# Patient Record
Sex: FEMALE | Race: WHITE | NOT HISPANIC OR LATINO | Employment: OTHER | URBAN - NONMETROPOLITAN AREA
[De-identification: names, ages, dates, MRNs, and addresses within clinical notes are randomized per-mention and may not be internally consistent; named-entity substitution may affect disease eponyms.]

---

## 2018-06-12 ENCOUNTER — OFFICE VISIT (OUTPATIENT)
Dept: CARDIOLOGY | Facility: CLINIC | Age: 73
End: 2018-06-12

## 2018-06-12 VITALS
WEIGHT: 165 LBS | BODY MASS INDEX: 27.49 KG/M2 | HEIGHT: 65 IN | HEART RATE: 73 BPM | DIASTOLIC BLOOD PRESSURE: 80 MMHG | SYSTOLIC BLOOD PRESSURE: 143 MMHG

## 2018-06-12 DIAGNOSIS — R07.89 OTHER CHEST PAIN: ICD-10-CM

## 2018-06-12 DIAGNOSIS — I10 ESSENTIAL HYPERTENSION: Primary | ICD-10-CM

## 2018-06-12 DIAGNOSIS — E78.5 HYPERLIPIDEMIA, UNSPECIFIED HYPERLIPIDEMIA TYPE: ICD-10-CM

## 2018-06-12 PROCEDURE — 99204 OFFICE O/P NEW MOD 45 MIN: CPT | Performed by: INTERNAL MEDICINE

## 2018-06-12 PROCEDURE — 93000 ELECTROCARDIOGRAM COMPLETE: CPT | Performed by: INTERNAL MEDICINE

## 2018-06-12 RX ORDER — GABAPENTIN 300 MG/1
300 CAPSULE ORAL NIGHTLY
COMMUNITY

## 2018-06-12 RX ORDER — SIMVASTATIN 40 MG
40 TABLET ORAL DAILY
COMMUNITY

## 2018-06-12 RX ORDER — NITROGLYCERIN 0.4 MG/1
TABLET SUBLINGUAL
Qty: 100 TABLET | Refills: 11 | Status: SHIPPED | OUTPATIENT
Start: 2018-06-12

## 2018-06-12 RX ORDER — LOSARTAN POTASSIUM 50 MG/1
100 TABLET ORAL DAILY
COMMUNITY
End: 2018-07-25 | Stop reason: SDUPTHER

## 2018-06-12 RX ORDER — ATENOLOL 50 MG/1
TABLET ORAL DAILY
COMMUNITY
Start: 2018-04-24 | End: 2018-06-12 | Stop reason: ALTCHOICE

## 2018-06-12 RX ORDER — LEVOTHYROXINE SODIUM 0.1 MG/1
100 TABLET ORAL DAILY
COMMUNITY

## 2018-06-12 RX ORDER — CYCLOBENZAPRINE HCL 10 MG
10 TABLET ORAL 3 TIMES DAILY
COMMUNITY

## 2018-06-12 RX ORDER — CARVEDILOL 12.5 MG/1
12.5 TABLET ORAL 2 TIMES DAILY
Qty: 180 TABLET | Refills: 3 | Status: SHIPPED | OUTPATIENT
Start: 2018-06-12

## 2018-06-12 NOTE — PROGRESS NOTES
Subjective    Jeremiah Dennis is a 72 y.o. female. Referred by pcp for labile htn and cp      History of Present Illness     CHEST PAIN:  Has had for a long time. Non-exertional and sharp left sided pain that radiates to left arm. Has seen Dr Dailey in [facility] and had a stress nuc 5/16 that was low risk for ischemia and the cp did not occur during the test. LVEF was normal too. She has not had any testing since to look for non-cardiac causes of cp. EKG is FAV but ow nml. She has first degree relatives with IHD and she shares similar traits with them which scares her. She has had ER visits for this that have been neg for ACS. She has known GERD but has not tried antacids for this pain.    HTN:  She compulsively checks her BP and uses Clonidine prn. 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 She wants to change BP meds at this time. Labs are ok. She doesn't think that pain or anxiety is playing a role in her BP elevations but is unwilling to stop NSAID's. She is not interested in life-style / dietary changes at this time.    HLD:  Tolerates a potent statin but LDL is 89 at last check - she is considering stopping but has been encouraged not to.        The following portions of the patient's history were reviewed and updated as appropriate: allergies, current medications, past family history, past medical history, past social history, past surgical history and problem list.    Patient Active Problem List   Diagnosis   • Other chest pain   • Hypertension   • Hyperlipidemia       Allergies   Allergen Reactions   • Iodine Anaphylaxis   • Aspirin Other (See Comments)   • Nickel Rash       Family History   Problem Relation Age of Onset   • Stroke Father    • Heart attack Brother        Social History     Social History   • Marital status:      Spouse name: N/A   • Number of children: N/A   • Years of education: N/A     Occupational History   • Not on file.     Social History Main Topics   •  Smoking status: Former Smoker     Quit date: 1980   • Smokeless tobacco: Never Used   • Alcohol use No   • Drug use: No   • Sexual activity: Defer     Other Topics Concern   • Not on file     Social History Narrative   • No narrative on file         Current Outpatient Prescriptions:   •  cyclobenzaprine (FLEXERIL) 10 MG tablet, 10 mg 3 (Three) Times a Day., Disp: , Rfl:   •  diclofenac (VOLTAREN) 50 MG EC tablet, 50 mg 2 (Two) Times a Day., Disp: , Rfl:   •  esomeprazole 80 mg in sodium chloride 0.9 % 100 mL IVPB, Take 40 mg by mouth Daily., Disp: , Rfl:   •  estrogens, conjugated, (PREMARIN) 0.3 MG tablet, 0.3 mg Daily. TAKING EVERY OTHER DAY, Disp: , Rfl:   •  gabapentin (NEURONTIN) 300 MG capsule, 300 mg 3 (Three) Times a Day., Disp: , Rfl:   •  levothyroxine (LEVOXYL) 100 MCG tablet, 100 mcg Daily., Disp: , Rfl:   •  losartan (COZAAR) 50 MG tablet, Take 25 mg by mouth 2 (Two) Times a Day. TAKING TWO 25 MG TABLETS TWICE DAILY, Disp: , Rfl:   •  simvastatin (ZOCOR) 40 MG tablet, 40 mg Daily., Disp: , Rfl:   •  carvedilol (COREG) 12.5 MG tablet, Take 1 tablet by mouth 2 (Two) Times a Day., Disp: 180 tablet, Rfl: 3  •  nitroglycerin (NITROSTAT) 0.4 MG SL tablet, 1 under the tongue as needed for angina, may repeat q5mins for up three doses, Disp: 100 tablet, Rfl: 11    Past Surgical History:   Procedure Laterality Date   • GALLBLADDER SURGERY     • HYSTERECTOMY         Review of Systems   Constitutional: Negative for fatigue, fever and unexpected weight change.   HENT: Negative for congestion.    Eyes: Negative for visual disturbance.   Respiratory: Negative for apnea, shortness of breath and wheezing.    Cardiovascular: Positive for chest pain. Negative for palpitations and leg swelling.   Gastrointestinal: Negative for abdominal pain, blood in stool and vomiting.   Endocrine: Negative for cold intolerance and heat intolerance.   Genitourinary: Negative for difficulty urinating and hematuria.   Musculoskeletal:  "Positive for arthralgias. Negative for myalgias.   Skin: Negative for rash.   Neurological: Negative for syncope.   Hematological: Does not bruise/bleed easily.   Psychiatric/Behavioral: Negative for sleep disturbance.       /80   Pulse 73   Ht 163.8 cm (64.5\")   Wt 74.8 kg (165 lb)   BMI 27.88 kg/m²   Procedures    Objective   Physical Exam   Constitutional: She is oriented to person, place, and time. She appears well-developed and well-nourished. No distress.   HENT:   Head: Normocephalic.   Eyes: Pupils are equal, round, and reactive to light.   Neck: No thyromegaly present.   Cardiovascular: Normal rate, regular rhythm, normal heart sounds and intact distal pulses.  Exam reveals no gallop and no friction rub.    No murmur heard.  Pulmonary/Chest: Effort normal and breath sounds normal. No respiratory distress. She has no wheezes. She has no rales.   Abdominal: Soft. Bowel sounds are normal. She exhibits no distension. There is no tenderness. There is no guarding.   Musculoskeletal: She exhibits no edema.   Neurological: She is alert and oriented to person, place, and time.   Skin: Skin is warm and dry. She is not diaphoretic.   Psychiatric: She has a normal mood and affect.       Assessment/Plan   Jeremiah was seen today for chest pain, hypertension and hyperlipidemia.    Diagnoses and all orders for this visit:    Essential hypertension  Comments:  CHANGE ATENOLOL TO CARVEDILOL, HOME BP DIARY TO NEXT ERIC  Orders:  -     ECG 12 Lead  -     carvedilol (COREG) 12.5 MG tablet; Take 1 tablet by mouth 2 (Two) Times a Day.    Hyperlipidemia, unspecified hyperlipidemia type  Comments:  CPT    Other chest pain  Comments:  ATYPICAL FOR ANGINA - TRY PRN LIQUID ANTACIDS BEFORE TRYING NTG SL FOR RECURRENCE - CALL EMS FOR CP NOT RELIEVED WITH EITHER  Orders:  -     nitroglycerin (NITROSTAT) 0.4 MG SL tablet; 1 under the tongue as needed for angina, may repeat q5mins for up three doses    Other orders  -     SCANNED " EKG                 Return in about 6 weeks (around 7/24/2018).  Orders Placed This Encounter   Procedures   • SCANNED EKG   • ECG 12 Lead     Order Specific Question:   Reason for Exam:     Answer:   CHEST PAIN

## 2018-07-25 ENCOUNTER — OFFICE VISIT (OUTPATIENT)
Dept: CARDIOLOGY | Facility: CLINIC | Age: 73
End: 2018-07-25

## 2018-07-25 VITALS
WEIGHT: 162 LBS | DIASTOLIC BLOOD PRESSURE: 80 MMHG | SYSTOLIC BLOOD PRESSURE: 112 MMHG | BODY MASS INDEX: 28.7 KG/M2 | OXYGEN SATURATION: 98 % | HEART RATE: 81 BPM | HEIGHT: 63 IN

## 2018-07-25 DIAGNOSIS — R07.89 OTHER CHEST PAIN: ICD-10-CM

## 2018-07-25 DIAGNOSIS — E78.5 HYPERLIPIDEMIA, UNSPECIFIED HYPERLIPIDEMIA TYPE: ICD-10-CM

## 2018-07-25 DIAGNOSIS — I10 ESSENTIAL HYPERTENSION: Primary | ICD-10-CM

## 2018-07-25 PROCEDURE — 99213 OFFICE O/P EST LOW 20 MIN: CPT | Performed by: PHYSICIAN ASSISTANT

## 2018-07-25 RX ORDER — LOSARTAN POTASSIUM 50 MG/1
50 TABLET ORAL 2 TIMES DAILY
Qty: 180 TABLET | Refills: 3 | Status: SHIPPED | OUTPATIENT
Start: 2018-07-25

## 2018-07-25 NOTE — PROGRESS NOTES
Subjective:     Encounter Date:07/25/2018      Patient ID: Jeremiah Dennis is a 72 y.o. female w hx of HTN and HLD who [facility]. She notes since changing from Atenolol to Carvedilol her blood pressure is improving. She shows me her home blood pressure log with average SBP 120s-130s. She denies any further chest pain since her last visit. She describes an active lifestyle with good stamina; denying any significant dyspnea on exertion.     Chief Complaint:  Hypertension   This is a chronic problem. The current episode started more than 1 year ago. The problem has been gradually improving since onset. The problem is controlled. Pertinent negatives include no chest pain, malaise/fatigue, orthopnea, palpitations, PND or shortness of breath. There are no associated agents to hypertension. Risk factors for coronary artery disease include post-menopausal state and dyslipidemia. Past treatments include beta blockers and angiotensin blockers. Current antihypertension treatment includes beta blockers and angiotensin blockers. The current treatment provides moderate improvement. Compliance problems include exercise and diet.  There is no history of CAD/MI. There is no history of chronic renal disease.   Chest Pain    This is a recurrent problem. The current episode started more than 1 month ago. The onset quality is sudden. The problem occurs rarely. The problem has been resolved. The pain is present in the lateral region. The pain is mild. The pain does not radiate. Pertinent negatives include no claudication, exertional chest pressure, hemoptysis, irregular heartbeat, malaise/fatigue, nausea, near-syncope, orthopnea, palpitations, PND, shortness of breath, syncope or vomiting. The pain is aggravated by nothing. She has tried rest for the symptoms. The treatment provided moderate relief.       The following portions of the patient's history were reviewed and updated as appropriate:  "allergies, current medications, past family history, past medical history, past social history, past surgical history and problem list.    Review of Systems   Constitution: Negative for malaise/fatigue and weight gain.   Cardiovascular: Positive for dyspnea on exertion. Negative for chest pain, claudication, irregular heartbeat, leg swelling, near-syncope, orthopnea, palpitations, paroxysmal nocturnal dyspnea and syncope.   Respiratory: Negative for hemoptysis and shortness of breath.    Hematologic/Lymphatic: Negative for bleeding problem.   Skin: Negative for poor wound healing.   Musculoskeletal: Negative for myalgias.   Gastrointestinal: Negative for melena, nausea and vomiting.   Genitourinary: Negative for hematuria.   Neurological: Negative for focal weakness and light-headedness.   Psychiatric/Behavioral: Negative for memory loss.   All other systems reviewed and are negative.      Procedures       Objective:     Physical Exam   Constitutional: She is oriented to person, place, and time. She appears well-developed and well-nourished.   HENT:   Head: Normocephalic and atraumatic.   Eyes: Pupils are equal, round, and reactive to light. Conjunctivae and EOM are normal.   Neck: Normal range of motion. Neck supple. No JVD present.   Cardiovascular: Normal rate, regular rhythm, S1 normal, S2 normal, normal heart sounds, intact distal pulses and normal pulses.    No murmur heard.  Pulmonary/Chest: Effort normal and breath sounds normal. No respiratory distress.   Abdominal: Soft. Bowel sounds are normal. She exhibits no distension.   Musculoskeletal: She exhibits no edema or tenderness.   Neurological: She is alert and oriented to person, place, and time.   Skin: Skin is warm and dry.   Psychiatric: She has a normal mood and affect. Judgment normal.   Vitals reviewed.      /80   Pulse 81   Ht 160 cm (63\")   Wt 73.5 kg (162 lb)   SpO2 98%   BMI 28.70 kg/m²     Current Outpatient Prescriptions:   •  " carvedilol (COREG) 12.5 MG tablet, Take 1 tablet by mouth 2 (Two) Times a Day., Disp: 180 tablet, Rfl: 3  •  cyclobenzaprine (FLEXERIL) 10 MG tablet, 10 mg 3 (Three) Times a Day., Disp: , Rfl:   •  diclofenac (VOLTAREN) 50 MG EC tablet, 50 mg 2 (Two) Times a Day., Disp: , Rfl:   •  esomeprazole 80 mg in sodium chloride 0.9 % 100 mL IVPB, Take 40 mg by mouth Daily., Disp: , Rfl:   •  estrogens, conjugated, (PREMARIN) 0.3 MG tablet, 0.3 mg Daily. TAKING EVERY OTHER DAY, Disp: , Rfl:   •  gabapentin (NEURONTIN) 300 MG capsule, 300 mg Every Night., Disp: , Rfl:   •  levothyroxine (LEVOXYL) 100 MCG tablet, 100 mcg Daily., Disp: , Rfl:   •  losartan (COZAAR) 50 MG tablet, Take 1 tablet by mouth 2 (Two) Times a Day., Disp: 180 tablet, Rfl: 3  •  nitroglycerin (NITROSTAT) 0.4 MG SL tablet, 1 under the tongue as needed for angina, may repeat q5mins for up three doses, Disp: 100 tablet, Rfl: 11  •  simvastatin (ZOCOR) 40 MG tablet, 40 mg Daily., Disp: , Rfl:   Past Medical History:   Diagnosis Date   • Hyperlipidemia    • Hypertension      Past Surgical History:   Procedure Laterality Date   • GALLBLADDER SURGERY     • HYSTERECTOMY       Allergies   Allergen Reactions   • Iodine Anaphylaxis   • Aspirin Other (See Comments)   • Nickel Rash     Social History     Social History   • Marital status:      Spouse name: N/A   • Number of children: N/A   • Years of education: N/A     Occupational History   • Not on file.     Social History Main Topics   • Smoking status: Former Smoker     Quit date: 1980   • Smokeless tobacco: Never Used   • Alcohol use No   • Drug use: No   • Sexual activity: Defer     Other Topics Concern   • Not on file     Social History Narrative   • No narrative on file     Family History   Problem Relation Age of Onset   • Stroke Father    • Heart attack Brother            Assessment:          Diagnosis Plan   1. Essential hypertension      Improved control   2. Hyperlipidemia, unspecified  hyperlipidemia type      On statin  Followed by PCP   3. Other chest pain            Plan:     1. Continue present therapy  2. Follow-up in 6 months, unless needed sooner  3. Verbalized understanding of instructions.

## 2019-01-25 ENCOUNTER — OFFICE VISIT (OUTPATIENT)
Dept: CARDIOLOGY | Facility: CLINIC | Age: 74
End: 2019-01-25

## 2019-01-25 VITALS
OXYGEN SATURATION: 99 % | DIASTOLIC BLOOD PRESSURE: 80 MMHG | SYSTOLIC BLOOD PRESSURE: 130 MMHG | HEIGHT: 63 IN | BODY MASS INDEX: 28.35 KG/M2 | WEIGHT: 160 LBS | RESPIRATION RATE: 18 BRPM | HEART RATE: 69 BPM

## 2019-01-25 DIAGNOSIS — I10 ESSENTIAL HYPERTENSION: Primary | ICD-10-CM

## 2019-01-25 DIAGNOSIS — E78.2 MIXED HYPERLIPIDEMIA: ICD-10-CM

## 2019-01-25 DIAGNOSIS — I44.0 1ST DEGREE AV BLOCK: ICD-10-CM

## 2019-01-25 PROCEDURE — 93000 ELECTROCARDIOGRAM COMPLETE: CPT | Performed by: NURSE PRACTITIONER

## 2019-01-25 PROCEDURE — 99213 OFFICE O/P EST LOW 20 MIN: CPT | Performed by: NURSE PRACTITIONER

## 2019-01-25 NOTE — PATIENT INSTRUCTIONS
First-Degree Atrioventricular Block  What is atrioventricular block?  Atrioventricular (AV) block, also called heart block, is a problem with the system that controls how often the heart beats (heart rate) and the pattern of heart beats (heart rhythm). In this condition, the signals that travel from the heart’s upper chambers (atria) to its lower chambers (ventricles) move too slowly or are interrupted. There are several types of heart block:  · First-degree.  · Second-degree.  · Third-degree or complete.    What is first-degree heart block?    First-degree AV block is the least serious type of heart block. In this condition, the signals that control heart rate move too slowly. As a result, the heart may beat more slowly than normal. First-degree AV block can increase your risk of developing a type of irregular heartbeat called atrial fibrillation.  What are the causes?  This condition may be caused by:  · Any condition that damages the system that controls the heart’s rate and rhythm, such as a heart attack.  · Overstimulation of the nerve that slows down heart rate (vagus nerve). This cause is common among well-conditioned athletes.  · Some medicines that slow down heart rate, such as beta blockers or calcium channel blockers.  · Surgery that damages the heart.    Some people are born with this condition (congenital heart block), but most people develop it over time.  What increases the risk?  The risk for this condition increases with age. You are also more likely to develop this condition if you have:  · A history of heart attack.  · Heart failure.  · Coronary heart disease.  · Inflammation of heart muscle (myocarditis).  · Disease of heart muscle (cardiomyopathy).  · Infection of the heart valves (endocarditis).  · Infections or diseases that affect the heart. These include:  ? Lyme disease.  ? Sarcoidosis.  ? Hemochromatosis.  ? Rheumatic fever.  ? Muscle disorders including Lev disease and Lenegre  disease.    Babies are more likely to be born with heart block if:  · The mother has an autoimmune disease, such as lupus.  · The baby is born with a heart defect that affects the heart’s structure.  · A parent was born with a heart defect.    What are the signs or symptoms?  This condition usually does not cause any symptoms.  How is this diagnosed?  This condition may be diagnosed based on:  · A physical exam.  · Your medical history.  · A measurement of your pulse or heartbeat.  · Tests. These may include:  ? An electrocardiogram (ECG). This test is done to check for problems with electrical activity in the heart.  ? A Holter monitor or event monitor test. This test involves wearing a portable device that monitors your heart rate over time.  ? An electrophysiology (EP) study. This test involves having long, thin tubes (catheters) placed in the heart. This test records electrical signals in the heart.    How is this treated?  Usually, treatment is not needed for this condition. In some cases, treatment involves:  · Treating an underlying condition, such as heart disease.  · Changing or stopping any heart medicines that can cause heart block.    Follow these instructions at home:    · Take over-the-counter and prescription medicines only as told by your health care provider.  · Work with your health care provider to control lifestyle choices that increase your risk for heart disease. You may need to:  ? Get regular exercise. Each week, try to get 150 minutes of moderate-intensity activity (such as walking or yoga) or 75 minutes of vigorous activity (such as running or swimming). Ask your health care provider what type of exercise is safe for you.  ? Eat a heart-healthy diet with fruits and vegetables, whole grains, low-fat dairy products, and lean proteins like poultry and eggs. Your health care provider or diet and nutrition specialist (dietitian) can help you make healthy choices.  ? Maintain a healthy  weight.  ? Limit alcohol intake to no more than 1 drink per day for nonpregnant women and 2 drinks per day for men. One drink equals 12 oz of beer, 5 oz of wine, or 1½ oz of hard liquor.  · Do not use any products that contain nicotine or tobacco, such as cigarettes and e-cigarettes. If you need help quitting, ask your health care provider.  · Keep all follow-up visits as told by your health care provider. This is important.  Contact a health care provider if:  · You feel like your heart is skipping beats.  · You feel more tired than normal.  · You have swelling in your hands, feet, or lower legs.  Get help right away if:  · Your symptoms change or they get worse.  · You develop new symptoms.  · You have chest pain, especially if the pain:  ? Feels like crushing or pressure.  ? Spreads to your arms, back, neck, or jaw.  · You feel short of breath.  · You feel light-headed or weak.  · You faint.  Summary  · First-degree AV block is the least serious type of heart block. In this condition, the signals that control heart rate move too slowly. As a result, the heart may beat more slowly than normal.  · Usually, treatment is not needed for this condition. In some cases, you may need to change or stop medications that may be making the condition worse.  · Healthy lifestyle choices such as exercising regularly, eating a healthy diet, and limiting alcohol are good for your heart.  This information is not intended to replace advice given to you by your health care provider. Make sure you discuss any questions you have with your health care provider.  Document Released: 11/30/2009 Document Revised: 08/04/2017 Document Reviewed: 08/04/2017  [employer] Interactive Patient Education © 2018 [employer]

## 2019-01-25 NOTE — PROGRESS NOTES
Subjective:     Encounter Date:01/25/2019      Patient ID: Jeremiah Dennis is a 73 y.o. female. She presents today for routine follow up. She has a history of hypertension and hyperlipidemia. She presents blood pressure log revealing excellent blood pressure control with readings in the 1250s-130s/70s-80s. She reports that her cholesterol is managed by her PCP and is reported to be well controlled. She reports occasional, mild bilateral lower extremity edema that she contributes to increased sodium intake. She denies chest pain, shortness of breath, palpitations, dizziness, syncope, orthopnea, PND or decreased stamina. She states that overall she has felt well since her last visit.     Chief Complaint: routine follow up  Hypertension   This is a chronic problem. The current episode started more than 1 year ago. The problem is controlled. Pertinent negatives include no anxiety, blurred vision, chest pain, headaches, malaise/fatigue, neck pain, orthopnea, palpitations, peripheral edema, PND, shortness of breath or sweats. Risk factors for coronary artery disease include post-menopausal state and dyslipidemia. Current antihypertension treatment includes beta blockers and angiotensin blockers. The current treatment provides significant improvement.   Hyperlipidemia   This is a chronic problem. The current episode started more than 1 year ago. Pertinent negatives include no chest pain or shortness of breath. Current antihyperlipidemic treatment includes statins. Risk factors for coronary artery disease include post-menopausal, hypertension and dyslipidemia.       The following portions of the patient's history were reviewed and updated as appropriate: allergies, current medications, past family history, past medical history, past social history, past surgical history and problem list.     Allergies   Allergen Reactions   • Iodine Anaphylaxis   • Aspirin Other (See Comments)   • Nickel Rash       Current Outpatient  Medications:   •  carvedilol (COREG) 12.5 MG tablet, Take 1 tablet by mouth 2 (Two) Times a Day., Disp: 180 tablet, Rfl: 3  •  cyclobenzaprine (FLEXERIL) 10 MG tablet, 10 mg 3 (Three) Times a Day., Disp: , Rfl:   •  diclofenac (VOLTAREN) 50 MG EC tablet, 50 mg 2 (Two) Times a Day., Disp: , Rfl:   •  esomeprazole 80 mg in sodium chloride 0.9 % 100 mL IVPB, Take 40 mg by mouth Daily., Disp: , Rfl:   •  estrogens, conjugated, (PREMARIN) 0.3 MG tablet, 0.3 mg Daily. TAKING EVERY OTHER DAY, Disp: , Rfl:   •  gabapentin (NEURONTIN) 300 MG capsule, 300 mg Every Night., Disp: , Rfl:   •  levothyroxine (LEVOXYL) 100 MCG tablet, 100 mcg Daily., Disp: , Rfl:   •  losartan (COZAAR) 50 MG tablet, Take 1 tablet by mouth 2 (Two) Times a Day., Disp: 180 tablet, Rfl: 3  •  nitroglycerin (NITROSTAT) 0.4 MG SL tablet, 1 under the tongue as needed for angina, may repeat q5mins for up three doses, Disp: 100 tablet, Rfl: 11  •  simvastatin (ZOCOR) 40 MG tablet, 40 mg Daily., Disp: , Rfl:   Past Medical History:   Diagnosis Date   • Hyperlipidemia    • Hypertension      Past Surgical History:   Procedure Laterality Date   • GALLBLADDER SURGERY     • HYSTERECTOMY       Family History   Problem Relation Age of Onset   • Stroke Father    • Heart attack Brother      Social History     Socioeconomic History   • Marital status:      Spouse name: Not on file   • Number of children: Not on file   • Years of education: Not on file   • Highest education level: Not on file   Social Needs   • Financial resource strain: Not on file   • Food insecurity - worry: Not on file   • Food insecurity - inability: Not on file   • Transportation needs - medical: Not on file   • Transportation needs - non-medical: Not on file   Occupational History   • Not on file   Tobacco Use   • Smoking status: Former Smoker     Last attempt to quit: 1980     Years since quittin.0   • Smokeless tobacco: Never Used   Substance and Sexual Activity   • Alcohol use: No  "  • Drug use: No   • Sexual activity: Defer   Other Topics Concern   • Not on file   Social History Narrative   • Not on file         Review of Systems   Constitution: Negative for chills, decreased appetite, fever, weakness, malaise/fatigue, night sweats, weight gain and weight loss.   HENT: Negative for nosebleeds.    Eyes: Negative for blurred vision and visual disturbance.   Cardiovascular: Positive for leg swelling. Negative for chest pain, dyspnea on exertion, near-syncope, orthopnea, palpitations, paroxysmal nocturnal dyspnea and syncope.   Respiratory: Negative for cough, hemoptysis, shortness of breath, snoring and wheezing.    Endocrine: Negative for cold intolerance and heat intolerance.   Hematologic/Lymphatic: Does not bruise/bleed easily.   Skin: Negative for rash.   Musculoskeletal: Negative for back pain, falls and neck pain.   Gastrointestinal: Negative for abdominal pain, change in bowel habit, constipation, diarrhea, dysphagia, heartburn, nausea and vomiting.   Genitourinary: Negative for hematuria.   Neurological: Negative for dizziness, headaches and light-headedness.   Psychiatric/Behavioral: Negative for altered mental status.   Allergic/Immunologic: Negative for persistent infections.         ECG 12 Lead  Date/Time: 1/25/2019 9:28 AM  Performed by: Kiera Lindsey APRN  Authorized by: Kiera Lindsey APRN   Comparison: compared with previous ECG from 6/12/2018  Similar to previous ECG  Rhythm: sinus rhythm and A-V block  Rate: normal  BPM: 69  Conduction: 1st degree  T depression: aVR  Clinical impression: abnormal ECG          /80 (BP Location: Right arm, Patient Position: Sitting, Cuff Size: Adult)   Pulse 69   Resp 18   Ht 160 cm (63\")   Wt 72.6 kg (160 lb)   SpO2 99%   Breastfeeding? No   BMI 28.34 kg/m²        Objective:     Physical Exam   Constitutional: She is oriented to person, place, and time. Vital signs are normal. She appears well-developed and well-nourished. No " distress.   HENT:   Head: Normocephalic and atraumatic.   Right Ear: External ear normal.   Left Ear: External ear normal.   Nose: Nose normal.   Eyes: Conjunctivae are normal. Pupils are equal, round, and reactive to light. Right eye exhibits no discharge. Left eye exhibits no discharge.   Neck: Normal range of motion. Neck supple. No JVD present. Carotid bruit is not present. No tracheal deviation present. No thyromegaly present.   Cardiovascular: Normal rate, regular rhythm, normal heart sounds, intact distal pulses and normal pulses. PMI is not displaced. Exam reveals no gallop and no friction rub.   No murmur heard.  Pulses:       Radial pulses are 2+ on the right side, and 2+ on the left side.        Dorsalis pedis pulses are 2+ on the right side, and 2+ on the left side.        Posterior tibial pulses are 2+ on the right side, and 2+ on the left side.   Pulmonary/Chest: Effort normal and breath sounds normal. No respiratory distress. She has no decreased breath sounds. She has no wheezes. She has no rhonchi. She has no rales. She exhibits no tenderness.   Abdominal: Soft. She exhibits no distension. There is no tenderness.   Musculoskeletal: Normal range of motion. She exhibits no edema, tenderness or deformity.   Neurological: She is alert and oriented to person, place, and time.   Skin: Skin is warm and dry. No rash noted. She is not diaphoretic. No erythema. No pallor.   Psychiatric: She has a normal mood and affect. Her behavior is normal. Judgment and thought content normal.   Vitals reviewed.        Assessment:          Diagnosis Plan   1. Essential hypertension  Well controlled.    2. Mixed hyperlipidemia  Managed by PCP. On statin. Reported to be well controlled.    3. 1st degree AV block  Chronic. Asymptomatic.           Plan:       1. Continue medications as previously prescribed.  2. Report any worsening symptoms.  3. Report any signs of bleeding.  4. Continue heart healthy diet and regular  exercise as tolerated.   5. Follow up with PCP for blood pressure and cholesterol management and routine lab work.  6. Follow up with Dr. Saunders as needed.

## 2024-07-18 ENCOUNTER — OFFICE VISIT (OUTPATIENT)
Dept: NEUROLOGY | Age: 79
End: 2024-07-18
Payer: MEDICARE

## 2024-07-18 VITALS — SYSTOLIC BLOOD PRESSURE: 140 MMHG | OXYGEN SATURATION: 98 % | HEART RATE: 77 BPM | DIASTOLIC BLOOD PRESSURE: 77 MMHG
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
## 2024-07-18 DIAGNOSIS — R42 DIZZINESS: ICD-10-CM

## 2024-07-18 PROCEDURE — 99204 OFFICE O/P NEW MOD 45 MIN: CPT | Performed by: NURSE PRACTITIONER

## 2024-07-18 PROCEDURE — 1123F ACP DISCUSS/DSCN MKR DOCD: CPT | Performed by: NURSE PRACTITIONER

## 2024-07-18 RX ORDER — ESOMEPRAZOLE MAGNESIUM 40 MG/1
CAPSULE, DELAYED RELEASE ORAL
COMMUNITY
Start: 2024-05-13

## 2024-07-18 RX ORDER — LISINOPRIL 20 MG/1
1 TABLET ORAL DAILY
COMMUNITY

## 2024-07-18 RX ORDER — CONJUGATED ESTROGENS 0.3 MG/1
TABLET, FILM COATED ORAL
COMMUNITY
Start: 2024-05-23

## 2024-07-18 RX ORDER — LEVOTHYROXINE SODIUM 88 UG/1
TABLET ORAL
COMMUNITY

## 2024-07-18 RX ORDER — ROSUVASTATIN CALCIUM 40 MG/1
TABLET, COATED ORAL
COMMUNITY
Start: 2024-05-22

## 2024-07-18 RX ORDER — CLOPIDOGREL BISULFATE 75 MG/1
1 TABLET ORAL DAILY
COMMUNITY

## 2024-07-18 RX ORDER — LIFITEGRAST 50 MG/ML
SOLUTION/ DROPS OPHTHALMIC
COMMUNITY

## 2024-07-18 RX ORDER — GABAPENTIN 300 MG/1
CAPSULE ORAL
COMMUNITY
Start: 2024-05-22

## 2024-07-18 RX ORDER — CARVEDILOL 12.5 MG/1
1 TABLET ORAL 2 TIMES DAILY
COMMUNITY
Start: 2018-06-12

## 2024-07-18 RX ORDER — CYCLOBENZAPRINE HCL 10 MG
1 TABLET ORAL 3 TIMES DAILY PRN
COMMUNITY

## 2024-07-18 NOTE — PROGRESS NOTES
REVIEW OF SYSTEMS    Constitutional: []Fever []Sweats []Chills [] Recent Injury [x] Denies all unless marked  HEENT:[]Headache  [] Head Injury [] Hearing Loss  [] Sore Throat  [] Ear Ache [x] Denies all unless marked  Spine:  [] Neck pain  [] Back pain  [] Sciaticia  [x] Denies all unless marked  Cardiovascular:[]Heart Disease []Palpitations [] Chest Pain   [x] Denies all unless marked  Pulmonary: []Shortness of Breath []Cough   [x] Denies all unless marked  Psychiatric/Behavioral:[] Depression [] Anxiety [x] Denies all unless marked  Gastrointestinal: []Nausea  []Vomiting  []Abdominal Pain  []Constipation  []Diarrhea  [x] Denies all unless marked  Genitourinary:   [] Frequency  [] Urgency  [] Dysuria [] Incontinence  [x] Denies all unless marked  Extremities: []Pain  []Swelling  [x] Denies all unless marked  Musculoskeletal: [] Myalgias  [] Joint Pain  [] Arthritis [] Muscle Cramps [] Muscle Twitches  [x] Denies all unless marked  Sleep: []Insomnia[]Snoring []Restless Legs  []Sleep Apnea  []Daytime Sleepiness  [x] Denies all unless marked  Skin:[] Rash [] Color Change [x] Denies all unless marked   Neurological:[]Visual Disturbance [] Memory Loss []Loss of Balance []Slurred Speech []Weakness []Seizures  [] Dizziness [x] Denies all unless marked      
  [x]Affect attention and concentration appear appropriate  [x]Recent and remote memory appears unremarkable  [x]Speech normal without dysarthria or aphasia, comprehension and repetition intact.   COMMENTS:    Cranial Nerves [x]No VF deficit to confrontation,  no papilledema on fundoscopic exam.  [x]PERRLA, EOMI, no nystagmus, conjugate eye movements, no ptosis  [x]Face symmetric  [x]Facial sensation intact  [x]Tongue midline no atrophy or fasciculations present  [x]Palate midline, hearing to finger rub normal bilaterally  [x]Shoulder shrug and SCM testing normal bilaterally  COMMENTS:   Motor   [x]5/5 strength x 4 extremities  [x]Normal bulk and tone  [x]No tremor present  [x]No rigidity or bradykinesia noted  COMMENTS:    Sensory  []Sensation intact to light touch, pin prick, vibration, and proprioception BLE  [x]Sensation intact to light touch, pin prick, vibration, and proprioception BUE  COMMENTS: decreased PP sensation BLE    Coordination [x]FTN normal bilaterally   [x]HTS normal bilaterally  [x]JUDSON normal bilaterally.   COMMENTS:    Reflexes  []Symmetric and non-pathological  [x]Toes down going bilaterally  [x]No clonus present  COMMENTS: hypoactive throughout    Gait                  []Normal steady gait    []Ataxic    []Spastic     []Magnetic     []Shuffling  COMMENTS: cautious, unsteady        LABS RECORD AND IMAGING REVIEW (As below and per HPI)    No results found for: \"[id number]\"  No results found for: \"WBC\", \"HGB\", \"HCT\", \"MCV\", \"PLT\"  No results found for: \"NA\", \"K\", \"CL\", \"CO2\", \"BUN\", \"CREATININE\", \"GLUCOSE\", \"CALCIUM\", \"LABALBU\", \"BILITOT\", \"ALKPHOS\", \"AST\", \"ALT\", \"LABGLOM\", \"GFRAA\", \"AGRATIO\", \"GLOB\"  No results found for: \"CHOL\", \"TRIG\", \"HDL\"  No results found for: \"TSH\", \"T4FREE\"  No results found for: \"CRP\", \"SEDRATE\"     Reviewed referral records, reviewed ER records    CT head (6/2024)-atrophy.  Left posterior parietal scalp hematoma    CT cervical spine (6/2024)-severe degenerative

## 2024-07-22 ENCOUNTER — CLINICAL DOCUMENTATION (OUTPATIENT)
Dept: NEUROLOGY | Age: 79
End: 2024-07-22

## 2024-07-22 NOTE — PROGRESS NOTES
Procedures: 59225- MRI Brain WO Contrast  Primary Diagnosis: W19.XXXA  Additional Diagnosis:     Primary Coverage: [facility] Medicare  Secondary Coverage Requires Auth:  Primary Payer Auth Status: No Auth Required  Primary Pending Case/Reference:   Secondary Payer Auth Status: [id number]  Source: web  Primary Auth Number:  Primary Auth Status:  Primary Auth Date From:  Primary Auth Date To:  Secondary Payer Auth Status:  Secondary Pending Case/Reference:  Secondary Auth Date From:  Secondary Auth Date To:  Secondary Source:     Primary Website URL: [email or web address]  Secondary Website URL:  Clinicals Submitted:  NO     Additional Comments: faxed order to Mauro Seo   [phone]

## 2024-07-23 ENCOUNTER — TELEPHONE (OUTPATIENT)
Dept: NEUROLOGY | Age: 79
End: 2024-07-23

## 2024-07-23 NOTE — TELEPHONE ENCOUNTER
Spoke with Dani to let her know that her MRI is scheduled at [facility] for August 16, 2024 at 7:45 am. Patient thanked me for calling

## 2024-08-26 ENCOUNTER — TELEPHONE (OUTPATIENT)
Dept: NEUROSURGERY | Age: 79
End: 2024-08-26

## 2024-08-26 NOTE — TELEPHONE ENCOUNTER
MRI brain is overall unremarkable for her age.  There is some atrophy which can happen as we get older.  There is small vessel disease, again associated with age, hypertension, hyperlipidemia.  No evidence of anything acute.  The radiologist read of possible demyelinating disease is felt to be less likely given her exam, history, age.  Have her bring the images on a CD so I can review myself.

## 2024-08-26 NOTE — TELEPHONE ENCOUNTER
Dani would like a call to discuss  MRI results . Patient is very concerned and request a call back to discuss. Dani preferred call back time is   as soon as possible      Thank you.

## 2024-08-27 NOTE — TELEPHONE ENCOUNTER
Called and spoke with pt advised her of results below. Pt voiced understanding and has disc for next appointment.